# Patient Record
Sex: MALE | Race: WHITE
[De-identification: names, ages, dates, MRNs, and addresses within clinical notes are randomized per-mention and may not be internally consistent; named-entity substitution may affect disease eponyms.]

---

## 2018-07-04 ENCOUNTER — HOSPITAL ENCOUNTER (EMERGENCY)
Dept: HOSPITAL 41 - JD.ED | Age: 18
LOS: 1 days | Discharge: HOME | End: 2018-07-05
Payer: COMMERCIAL
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
## 2018-07-04 DIAGNOSIS — T23.151A: Primary | ICD-10-CM

## 2018-07-04 DIAGNOSIS — F17.210: ICD-10-CM
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
## 2018-07-05 RX ADMIN — SILVER SULFADIAZINE ONE: 10 CREAM TOPICAL at 01:24

## 2018-07-05 RX ADMIN — SILVER SULFADIAZINE ONE APPLIC: 10 CREAM TOPICAL at 01:22

## 2018-07-05 NOTE — CR
Right hand:  Four views of the right hand were obtained.

 

Comparison: No prior hand exam is available.

 

No fracture, dislocation or other bony abnormality is seen.

 

Impression:

1.  No abnormality is identified on right hand exam.

 

Diagnostic code #1